# Patient Record
Sex: MALE | Race: WHITE | URBAN - METROPOLITAN AREA
[De-identification: names, ages, dates, MRNs, and addresses within clinical notes are randomized per-mention and may not be internally consistent; named-entity substitution may affect disease eponyms.]

---

## 2020-09-11 ENCOUNTER — VIRTUAL VISIT (OUTPATIENT)
Dept: FAMILY MEDICINE | Facility: CLINIC | Age: 35
End: 2020-09-11
Payer: MEDICAID

## 2020-09-11 VITALS — BODY MASS INDEX: 24.38 KG/M2 | HEIGHT: 74 IN | WEIGHT: 190 LBS

## 2020-09-11 DIAGNOSIS — Z20.822 ENCOUNTER FOR LABORATORY TESTING FOR COVID-19 VIRUS: Primary | ICD-10-CM

## 2020-09-11 RX ORDER — VENLAFAXINE HYDROCHLORIDE 150 MG/1
150 TABLET, EXTENDED RELEASE ORAL AT BEDTIME
COMMUNITY

## 2020-09-11 ASSESSMENT — MIFFLIN-ST. JEOR: SCORE: 1861.58

## 2020-09-11 NOTE — PATIENT INSTRUCTIONS
Return to work if Covid test negative and no fever for 24 hours.     If test is positive, you should quarantine for 10 days since beginning of symptoms (until 9/19) AND MUST BE without symptoms AND fever free for 24 hours.     We will call with results.

## 2020-09-11 NOTE — PROGRESS NOTES
"Family Medicine Telephone Visit Note         Telephone Visit Consent   Patient was verbally read the following and verbal consent was obtained.    \"Telephone visits are billed at different rates depending on your insurance coverage. During this emergency period, for some insurers they may be billed the same as an in-person visit.  Please reach out to your insurance provider with any questions.  If during the course of the call the physician/provider feels a telephone visit is not appropriate, you will not be charged for this service.\"    Name person giving consent:  Patient   Date verbal consent given:  9/11/2020  Time verbal consent given:  3:05 PM      Chief Complaint   Patient presents with     Fever     up and down- took temp  today was 98, yest [id number]- states that he feels much better today.      Headache     Chills     Abdominal Pain     Nausea          HPI   Patients name: Ranjan  Appointment start time:  3:39 PM    Social Determinants of Health Screening  Food Insecurity:  Within the past 12 months, did you worry whether your food would run out before you would have money to buy more?  No    Within the past 12 months, did you find the food you bought just didn't last and/or you didn't have money to get more?  No    Financial Insecurity:  Because of COVID-19, many people are now eligible for programs such as unemployment and may qualify for a stimulus check from the [place] government.     Have you had trouble accessing these programs? No    If yes, would you be interested in speaking on the phone with a  who could help you with these programs?  No     1. Flu-like symptoms - Two days ago developed \"head cold\", \"sick\" with headache, body aches, and fatigue. Then developed fever yesterday to 100.2, today normal but chills. He also has nausea, vomiting x1, diarrhea, decreased appetite. He is able to drink water. Able to keep eggs down today, appetite coming back. No sore throat or cough. No shortness of breath. " "No abdominal pain. No new foods. No ill contacts he knows of. No recent abx. Employer needs Covid test. 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 We will get him a note for work.     Current Outpatient Medications   Medication Sig Dispense Refill     venlafaxine (EFFEXOR-ER) 150 MG 24 hr tablet Take 150 mg by mouth At Bedtime       No Known Allergies           Review of Systems:     ROS negative unless stated above in HPI.         Physical Exam:     Ht 1.88 m (6' 2\")   Wt 86.2 kg (190 lb)   BMI 24.39 kg/m    Estimated body mass index is 24.39 kg/m  as calculated from the following:    Height as of this encounter: 1.88 m (6' 2\").    Weight as of this encounter: 86.2 kg (190 lb).    Exam:  Constitutional: alert and no distress  Psychiatric: mentation appears normal     Results from the last 24 hoursNo results found for this or any previous visit (from the past 24 hour(s)).        Assessment and Plan   1. Encounter for laboratory testing for COVID-19 virus  Viral gastro vs Covid. Put in a covid test for him. We will call with results. He will avoid going out until testing completed. Discussed when to quarantine. Printed a note for now for work. He will take Tylenol for body aches/fever.   - Symptomatic COVID-19 Virus (Coronavirus) by PCR; Future        After Visit Information:  Will print and mail AVS     No follow-ups on file.    Appointment end time: 4:03 PM  This is a telephone visit that took 24 minutes.    Clinician location:  [facility]    Jaz Paul MD PGY2  I precepted today with Dr. Rivera.      Preceptor Attestation:   Patient seen, evaluated and discussed with the resident. I have verified the content of the note, which accurately reflects my assessment of the patient and the plan of care.   Supervising Physician:  Ana Rivera MD   "

## 2020-09-11 NOTE — LETTER
RETURN TO WORK/SCHOOL FORM    9/11/2020    Re: Ranjan Garcia  1985      To Whom It May Concern:     Ranjan Garcia was seen in clinic today.  He may return to work without restrictions if Covid 19 negative and without fever for 24 hours.              Jaz Paul MD  9/11/2020 3:52 PM

## 2020-09-13 DIAGNOSIS — Z20.822 ENCOUNTER FOR LABORATORY TESTING FOR COVID-19 VIRUS: ICD-10-CM

## 2020-09-13 PROCEDURE — U0003 INFECTIOUS AGENT DETECTION BY NUCLEIC ACID (DNA OR RNA); SEVERE ACUTE RESPIRATORY SYNDROME CORONAVIRUS 2 (SARS-COV-2) (CORONAVIRUS DISEASE [COVID-19]), AMPLIFIED PROBE TECHNIQUE, MAKING USE OF HIGH THROUGHPUT TECHNOLOGIES AS DESCRIBED BY CMS-2020-01-R: HCPCS | Performed by: FAMILY MEDICINE

## 2020-09-14 LAB
SARS-COV-2 RNA SPEC QL NAA+PROBE: NOT DETECTED
SPECIMEN SOURCE: NORMAL

## 2020-09-16 ENCOUNTER — NURSE TRIAGE (OUTPATIENT)
Dept: NURSING | Facility: CLINIC | Age: 35
End: 2020-09-16

## 2020-09-16 NOTE — PROGRESS NOTES
Preceptor Attestation:    I talked to the patient on the phone and discussed the patient with the resident. I have verified the content of the note, which accurately reflects my assessment of the patient and the plan of care.   Supervising Physician:  Ana Rivera MD.

## 2020-09-16 NOTE — TELEPHONE ENCOUNTER
Coronavirus (COVID-19) Notification    Lab Result   Lab test 2019-nCoV rRt-PCR OR SARS-COV-2 PCR    Nasopharyngeal AND/OR Oropharyngeal swab is NEGATIVE for 2019-nCoV RNA [OR] SARS-COV-2 RNA (COVID-19) RNA    Your result was negative. This means that we didn't find the virus that causes COVID-19 in your sample. A test may show negative when you do actually have the virus. This can happen when the virus is in the early stages of infection, before you feel illness symptoms.    If you have symptoms   Stay home and away from others (self-isolate) until you meet ALL of the guidelines below:    You've had no fever--and no medicine that reduces fever--for 3 full days (72 hours). And      Your other symptoms have gotten better. For example, your cough or breathing has improved. And     At least 10 days have passed since your symptoms started.    During this time:    Stay home. Don't go to work, school or anywhere else.     Stay in your own room, including for meals. Use your own bathroom if you can.    Stay away from others in your home. No hugging, kissing or shaking hands. No visitors.    Clean  high touch  surfaces often (doorknobs, counters, handles, etc.). Use a household cleaning spray or wipes. You can find a full list on the EPA website at www.epa.gov/pesticide-registration/list-n-disinfectants-use-against-sars-cov-2.    Cover your mouth and nose with a mask, tissue or washcloth to avoid spreading germs.    Wash your hands and face often with soap and water.    Going back to work  Check with your employer for any guidelines to follow for going back to work.  You are sent a letter for your Employer which will serve as formal document notice that you, the employee, tested negative for COVID-19, as of the testing date shown above.    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    Where can I get more information?    [facility]:  [email or web address]    Coronavirus Basics: [email or web address]/[email or web address]    [facility] Hotline (137-973-8923)    {Name]  Leonora Lozano RN  [facility] Nurse Advisor

## 2021-08-05 ENCOUNTER — OFFICE VISIT (OUTPATIENT)
Dept: URBAN - NONMETROPOLITAN AREA CLINIC 13 | Facility: CLINIC | Age: 36
End: 2021-08-05